# Patient Record
Sex: FEMALE | Race: WHITE | Employment: OTHER | URBAN - METROPOLITAN AREA
[De-identification: names, ages, dates, MRNs, and addresses within clinical notes are randomized per-mention and may not be internally consistent; named-entity substitution may affect disease eponyms.]

---

## 2021-03-11 ENCOUNTER — HOSPITAL ENCOUNTER (OUTPATIENT)
Dept: PREADMISSION TESTING | Age: 66
Discharge: HOME | End: 2021-03-11
Payer: MEDICARE

## 2021-03-11 PROCEDURE — U0003 INFECTIOUS AGENT DETECTION BY NUCLEIC ACID (DNA OR RNA); SEVERE ACUTE RESPIRATORY SYNDROME CORONAVIRUS 2 (SARS-COV-2) (CORONAVIRUS DISEASE [COVID-19]), AMPLIFIED PROBE TECHNIQUE, MAKING USE OF HIGH THROUGHPUT TECHNOLOGIES AS DESCRIBED BY CMS-2020-01-R: HCPCS

## 2021-03-12 LAB — SARS-COV-2, COV2NT: NOT DETECTED

## 2021-03-16 ENCOUNTER — ANESTHESIA EVENT (OUTPATIENT)
Dept: SURGERY | Age: 66
End: 2021-03-16
Payer: MEDICARE

## 2021-03-16 RX ORDER — FENTANYL CITRATE 50 UG/ML
50 INJECTION, SOLUTION INTRAMUSCULAR; INTRAVENOUS
Status: CANCELLED | OUTPATIENT
Start: 2021-03-16

## 2021-03-16 RX ORDER — ONDANSETRON 2 MG/ML
4 INJECTION INTRAMUSCULAR; INTRAVENOUS ONCE
Status: CANCELLED | OUTPATIENT
Start: 2021-03-16 | End: 2021-03-16

## 2021-03-16 RX ORDER — OXYCODONE AND ACETAMINOPHEN 5; 325 MG/1; MG/1
1 TABLET ORAL AS NEEDED
Status: CANCELLED | OUTPATIENT
Start: 2021-03-16

## 2021-03-16 RX ORDER — DEXTROSE MONOHYDRATE 100 MG/ML
125-250 INJECTION, SOLUTION INTRAVENOUS AS NEEDED
Status: CANCELLED | OUTPATIENT
Start: 2021-03-16

## 2021-03-16 RX ORDER — MAGNESIUM SULFATE 100 %
4 CRYSTALS MISCELLANEOUS AS NEEDED
Status: CANCELLED | OUTPATIENT
Start: 2021-03-16

## 2021-03-16 RX ORDER — FENTANYL CITRATE 50 UG/ML
25 INJECTION, SOLUTION INTRAMUSCULAR; INTRAVENOUS AS NEEDED
Status: CANCELLED | OUTPATIENT
Start: 2021-03-16

## 2021-03-16 RX ORDER — SODIUM CHLORIDE 9 MG/ML
125 INJECTION, SOLUTION INTRAVENOUS CONTINUOUS
Status: CANCELLED | OUTPATIENT
Start: 2021-03-16

## 2021-03-16 RX ORDER — NALOXONE HYDROCHLORIDE 0.4 MG/ML
0.1 INJECTION, SOLUTION INTRAMUSCULAR; INTRAVENOUS; SUBCUTANEOUS AS NEEDED
Status: CANCELLED | OUTPATIENT
Start: 2021-03-16

## 2021-03-17 ENCOUNTER — HOSPITAL ENCOUNTER (OUTPATIENT)
Age: 66
Setting detail: OUTPATIENT SURGERY
Discharge: HOME | End: 2021-03-17
Attending: OPHTHALMOLOGY | Admitting: OPHTHALMOLOGY
Payer: MEDICARE

## 2021-03-17 ENCOUNTER — ANESTHESIA (OUTPATIENT)
Dept: SURGERY | Age: 66
End: 2021-03-17
Payer: MEDICARE

## 2021-03-17 VITALS
BODY MASS INDEX: 26.71 KG/M2 | HEART RATE: 62 BPM | OXYGEN SATURATION: 97 % | TEMPERATURE: 97.2 F | HEIGHT: 61 IN | DIASTOLIC BLOOD PRESSURE: 71 MMHG | WEIGHT: 141.5 LBS | SYSTOLIC BLOOD PRESSURE: 123 MMHG | RESPIRATION RATE: 16 BRPM

## 2021-03-17 PROBLEM — H26.9 CATARACT: Status: ACTIVE | Noted: 2021-03-17

## 2021-03-17 PROCEDURE — 77030040361 HC SLV COMPR DVT MDII -B: Performed by: OPHTHALMOLOGY

## 2021-03-17 PROCEDURE — 76210000020 HC REC RM PH II FIRST 0.5 HR: Performed by: OPHTHALMOLOGY

## 2021-03-17 PROCEDURE — 74011250636 HC RX REV CODE- 250/636: Performed by: NURSE ANESTHETIST, CERTIFIED REGISTERED

## 2021-03-17 PROCEDURE — 77030006685 HC BLD OPHTH ALCN -B: Performed by: OPHTHALMOLOGY

## 2021-03-17 PROCEDURE — 76010000154 HC OR TIME FIRST 0.5 HR: Performed by: OPHTHALMOLOGY

## 2021-03-17 PROCEDURE — 76060000031 HC ANESTHESIA FIRST 0.5 HR: Performed by: OPHTHALMOLOGY

## 2021-03-17 PROCEDURE — 77030013851 HC PK PHACO KT ALCN -B: Performed by: OPHTHALMOLOGY

## 2021-03-17 PROCEDURE — 77030020782 HC GWN BAIR PAWS FLX 3M -B: Performed by: OPHTHALMOLOGY

## 2021-03-17 PROCEDURE — 74011000250 HC RX REV CODE- 250: Performed by: OPHTHALMOLOGY

## 2021-03-17 PROCEDURE — 77030031761 HC CYSTOTOM OPHTH IRR SYS BVTC -A: Performed by: OPHTHALMOLOGY

## 2021-03-17 PROCEDURE — 2709999900 HC NON-CHARGEABLE SUPPLY: Performed by: OPHTHALMOLOGY

## 2021-03-17 PROCEDURE — 77030013389: Performed by: OPHTHALMOLOGY

## 2021-03-17 PROCEDURE — 74011250636 HC RX REV CODE- 250/636: Performed by: OPHTHALMOLOGY

## 2021-03-17 PROCEDURE — V2632 POST CHMBR INTRAOCULAR LENS: HCPCS | Performed by: OPHTHALMOLOGY

## 2021-03-17 PROCEDURE — 77030018837 HC SOL IRR OPTH ALCN -A: Performed by: OPHTHALMOLOGY

## 2021-03-17 DEVICE — ACRYSOF(R) SINGLE-PIECE ACRYLIC FOLDABLE IOL,UV-ABSORBING POSTERIOR CHAMBER LENS (IOL/PC),13.0MM LENGTH, 6.0MM BICONVEX OPTIC, PLANARHAPTICS.
Type: IMPLANTABLE DEVICE | Site: EYE | Status: FUNCTIONAL
Brand: ACRYSOF®

## 2021-03-17 RX ORDER — SODIUM CHLORIDE, SODIUM LACTATE, POTASSIUM CHLORIDE, CALCIUM CHLORIDE 600; 310; 30; 20 MG/100ML; MG/100ML; MG/100ML; MG/100ML
75 INJECTION, SOLUTION INTRAVENOUS CONTINUOUS
Status: DISCONTINUED | OUTPATIENT
Start: 2021-03-17 | End: 2021-03-17 | Stop reason: HOSPADM

## 2021-03-17 RX ORDER — EPINEPHRINE 1 MG/ML
INJECTION, SOLUTION, CONCENTRATE INTRAVENOUS AS NEEDED
Status: DISCONTINUED | OUTPATIENT
Start: 2021-03-17 | End: 2021-03-17 | Stop reason: HOSPADM

## 2021-03-17 RX ORDER — MIDAZOLAM HYDROCHLORIDE 1 MG/ML
INJECTION, SOLUTION INTRAMUSCULAR; INTRAVENOUS AS NEEDED
Status: DISCONTINUED | OUTPATIENT
Start: 2021-03-17 | End: 2021-03-17 | Stop reason: HOSPADM

## 2021-03-17 RX ORDER — PHENYLEPHRINE HYDROCHLORIDE 25 MG/ML
1 SOLUTION/ DROPS OPHTHALMIC
Status: COMPLETED | OUTPATIENT
Start: 2021-03-17 | End: 2021-03-17

## 2021-03-17 RX ORDER — LIDOCAINE HYDROCHLORIDE 10 MG/ML
INJECTION, SOLUTION EPIDURAL; INFILTRATION; INTRACAUDAL; PERINEURAL AS NEEDED
Status: DISCONTINUED | OUTPATIENT
Start: 2021-03-17 | End: 2021-03-17 | Stop reason: HOSPADM

## 2021-03-17 RX ORDER — FENTANYL CITRATE 50 UG/ML
INJECTION, SOLUTION INTRAMUSCULAR; INTRAVENOUS AS NEEDED
Status: DISCONTINUED | OUTPATIENT
Start: 2021-03-17 | End: 2021-03-17 | Stop reason: HOSPADM

## 2021-03-17 RX ORDER — TROPICAMIDE 10 MG/ML
1 SOLUTION/ DROPS OPHTHALMIC
Status: COMPLETED | OUTPATIENT
Start: 2021-03-17 | End: 2021-03-17

## 2021-03-17 RX ADMIN — LIDOCAINE HYDROCHLORIDE 2 DROP: 35 GEL OPHTHALMIC at 07:40

## 2021-03-17 RX ADMIN — PHENYLEPHRINE HYDROCHLORIDE 1 DROP: 2.5 SOLUTION/ DROPS OPHTHALMIC at 07:35

## 2021-03-17 RX ADMIN — SODIUM CHLORIDE, SODIUM LACTATE, POTASSIUM CHLORIDE, AND CALCIUM CHLORIDE 75 ML/HR: 600; 310; 30; 20 INJECTION, SOLUTION INTRAVENOUS at 07:30

## 2021-03-17 RX ADMIN — MIDAZOLAM 1 MG: 1 INJECTION INTRAMUSCULAR; INTRAVENOUS at 08:52

## 2021-03-17 RX ADMIN — FENTANYL CITRATE 25 MCG: 50 INJECTION, SOLUTION INTRAMUSCULAR; INTRAVENOUS at 08:48

## 2021-03-17 RX ADMIN — MIDAZOLAM 1 MG: 1 INJECTION INTRAMUSCULAR; INTRAVENOUS at 08:46

## 2021-03-17 RX ADMIN — FENTANYL CITRATE 25 MCG: 50 INJECTION, SOLUTION INTRAMUSCULAR; INTRAVENOUS at 08:55

## 2021-03-17 RX ADMIN — TROPICAMIDE 1 DROP: 10 SOLUTION/ DROPS OPHTHALMIC at 07:25

## 2021-03-17 RX ADMIN — FENTANYL CITRATE 25 MCG: 50 INJECTION, SOLUTION INTRAMUSCULAR; INTRAVENOUS at 09:00

## 2021-03-17 RX ADMIN — FENTANYL CITRATE 25 MCG: 50 INJECTION, SOLUTION INTRAMUSCULAR; INTRAVENOUS at 08:50

## 2021-03-17 RX ADMIN — PHENYLEPHRINE HYDROCHLORIDE 1 DROP: 2.5 SOLUTION/ DROPS OPHTHALMIC at 07:25

## 2021-03-17 RX ADMIN — PHENYLEPHRINE HYDROCHLORIDE 1 DROP: 2.5 SOLUTION/ DROPS OPHTHALMIC at 07:40

## 2021-03-17 RX ADMIN — TROPICAMIDE 1 DROP: 10 SOLUTION/ DROPS OPHTHALMIC at 07:35

## 2021-03-17 RX ADMIN — TROPICAMIDE 1 DROP: 10 SOLUTION/ DROPS OPHTHALMIC at 07:40

## 2021-03-17 RX ADMIN — PHENYLEPHRINE HYDROCHLORIDE 1 DROP: 2.5 SOLUTION/ DROPS OPHTHALMIC at 07:30

## 2021-03-17 RX ADMIN — TROPICAMIDE 1 DROP: 10 SOLUTION/ DROPS OPHTHALMIC at 07:30

## 2021-03-17 NOTE — PERIOP NOTES
Reviewed PTA medication list with patient/caregiver and patient/caregiver denies any additional medications. Patient admits to having a responsible adult care for them at home for at least 24 hours after surgery. Patient encouraged to use gown warming system and informed that using said warming gown to regulate body temperature prior to a procedure has been shown to help reduce the risks of blood clots and infection. Patient's pharmacy of choice verified and documented in PTA medication section. Dual skin assessment & fall risk band verification completed with Elaine Rome RN.

## 2021-03-17 NOTE — ANESTHESIA POSTPROCEDURE EVALUATION
Post-Anesthesia Evaluation and Assessment    Cardiovascular Function/Vital Signs  Visit Vitals  /64   Pulse 71   Temp 36.1 °C (97 °F)   Resp 15   Ht 5' 1\" (1.549 m)   Wt 64.2 kg (141 lb 8 oz)   SpO2 99%   BMI 26.74 kg/m²       Patient is status post Procedure(s):  CATARACT EXTRACTION WITH INTRA OCULAR LENS IMPLANT RIGHT EYE. Nausea/Vomiting: Controlled. Postoperative hydration reviewed and adequate. Pain:  Pain Scale 1: Numeric (0 - 10) (03/17/21 0717)  Pain Intensity 1: 0 (03/17/21 0717)   Managed. Neurological Status:   Neuro (WDL): Within Defined Limits (03/17/21 0729)   At baseline. Mental Status and Level of Consciousness: Baseline and appropriate for discharge. Pulmonary Status:   O2 Device: Room air (03/17/21 0911)   Adequate oxygenation and airway patent. Complications related to anesthesia: None    Post-anesthesia assessment completed. No concerns. Patient has met all discharge requirements.     Signed By: Delia Millard CRNA    March 17, 2021

## 2021-03-17 NOTE — PERIOP NOTES
Discharge instructions completed via phone call to Cam Hatchet- opportunity for questions - AVS med list reviewed for duplicates

## 2021-03-17 NOTE — DISCHARGE INSTRUCTIONS
Post-Operative Cataract Instructions  [facility] INVASIVE SURGERY [facility] Physicians  ALDO Mancilla. Ossie Rinne, M.D. [address] [facility]  [phone]      Diet  1. Resume normal diet. 2. Do not drink alcoholic beverages, including beer for 24 hours. Activity  1. Do not drive a car or operate any hazardous machinery the day of surgery. 2. You may resume normal activities as tolerated. 3. No bending or heavy lifting. 4. No reading or computer work after your surgery. You may watch TV. Wound Care  1. Anticipate that your eye will tear and water. 2. You may also experience a sensation of a foreign body, sand, or grit in the surgical eye, this is normal.  3. Do not touch the affected eye. 4. ** Do not remove eye shield unless directed to do so by your physician. **  5. Wear eye shield when resting or sleeping for one week. Medications  1. Take Tylenol Extra Strength two (2) tablets by mouth upon arrival home and then every four (4) hours as needed for discomfort. 2. Regarding Eye drops:  -  Begin using your eye drops as directed by your physician in the (right) operative eye. One drop of     []   Zymar    [x]  Vigamox   along with one (1) drop     []  Acular    [x]  Voltaren   every four (4) hours while awake. Wait five (5) minutes then apply one (1) drop     []  Pred Forte    [x]  Econopred Plus   every four (4) hours while awake. 3. If you take glaucoma medications, continue to do so unless the physician otherwise instructs you. 4. You may use artificial tears as needed if your eye feels scratchy. Notify your Physician Immediately for any of the followin. Excessive pain not relieved by Tylenol especially headache or increasing pressure to the operative eye. 2. Persistent nausea lasting more than eight hours. 3. If any vomiting occurs. If any questions or concerns arise, call your Surgeon at [phone].     Follow drop schedule from Dr. Tiff Escamilla office    Aleena Azucena from Nurse    PATIENT INSTRUCTIONS:    After general anesthesia or intravenous sedation, for 24 hours or while taking prescription Narcotics:  · Limit your activities  · Do not drive and operate hazardous machinery  · Do not make important personal or business decisions  · Do  not drink alcoholic beverages  · If you have not urinated within 8 hours after discharge, please contact your surgeon on call. Report the following to your surgeon:  · Excessive pain, swelling, redness or odor of or around the surgical area  · Temperature over 100.5  · Nausea and vomiting lasting longer than 4 hours or if unable to take medications  · Any signs of decreased circulation or nerve impairment to extremity: change in color, persistent  numbness, tingling, coldness or increase pain  · Any questions    What to do at Home:  Recommended activity: Ambulate in house,    If you experience any of the following symptoms above, please follow up with Dr. Nery Jeter. *  Please give a list of your current medications to your Primary Care Provider. *  Please update this list whenever your medications are discontinued, doses are      changed, or new medications (including over-the-counter products) are added. *  Please carry medication information at all times in case of emergency situations. These are general instructions for a healthy lifestyle:    No smoking/ No tobacco products/ Avoid exposure to second hand smoke  Surgeon General's Warning:  Quitting smoking now greatly reduces serious risk to your health.     Obesity, smoking, and sedentary lifestyle greatly increases your risk for illness    A healthy diet, regular physical exercise & weight monitoring are important for maintaining a healthy lifestyle    You may be retaining fluid if you have a history of heart failure or if you experience any of the following symptoms:  Weight gain of 3 pounds or more overnight or 5 pounds in a week, increased swelling in our hands or feet or shortness of breath while lying flat in bed.  Please call your doctor as soon as you notice any of these symptoms; do not wait until your next office visit.    Patient armband removed and shredded    The discharge information has been reviewed with the patient and caregiver.  The patient and caregiver verbalized understanding.  Discharge medications reviewed with the patient and caregiver and appropriate educational materials and side effects teaching were provided.    Learning About Coronavirus (COVID-19)  Coronavirus (COVID-19): Overview  What is coronavirus (COVID-19)?  The coronavirus disease (COVID-19) is caused by a virus. It is an illness that was first found in [facility], in December 2019. It has since spread worldwide.  The virus can cause fever, cough, and trouble breathing. In severe cases, it can cause pneumonia and make it hard to breathe without help. It can cause death.  Coronaviruses are a large group of viruses. They cause the common cold. They also cause more serious illnesses like Middle East respiratory syndrome (MERS) and severe acute respiratory syndrome (SARS). COVID-19 is caused by a novel coronavirus. That means it's a new type that has not been seen in people before.  This virus spreads person-to-person through droplets from coughing and sneezing. It can also spread when you are close to someone who is infected. And it can spread when you touch something that has the virus on it, such as a doorknob or a tabletop.  What can you do to protect yourself from coronavirus (COVID-19)?  The best way to protect yourself from getting sick is to:  · Avoid areas where there is an outbreak.  · Avoid contact with people who may be infected.  · Wash your hands often with soap or alcohol-based hand sanitizers.  · Avoid crowds and try to stay at least 6 feet away from other people.  · Wash your hands often, especially after you cough or sneeze.  Use soap and water, and scrub for at least 20 seconds. If soap and water aren't available, use an alcohol-based hand . · Avoid touching your mouth, nose, and eyes. What can you do to avoid spreading the virus to others? To help avoid spreading the virus to others:  · Cover your mouth with a tissue when you cough or sneeze. Then throw the tissue in the trash. · Use a disinfectant to clean things that you touch often. · Stay home if you are sick or have been exposed to the virus. Don't go to school, work, or public areas. And don't use public transportation. · If you are sick:  ? Leave your home only if you need to get medical care. But call the doctor's office first so they know you're coming. And wear a face mask, if you have one.  ? If you have a face mask, wear it whenever you're around other people. It can help stop the spread of the virus when you cough or sneeze. ? Clean and disinfect your home every day. Use household  and disinfectant wipes or sprays. Take special care to clean things that you grab with your hands. These include doorknobs, remote controls, phones, and handles on your refrigerator and microwave. And don't forget countertops, tabletops, bathrooms, and computer keyboards. When to call for help  Call 911 anytime you think you may need emergency care. For example, call if:  · You have severe trouble breathing. (You can't talk at all.)  · You have constant chest pain or pressure. · You are severely dizzy or lightheaded. · You are confused or can't think clearly. · Your face and lips have a blue color. · You pass out (lose consciousness) or are very hard to wake up. Call your doctor now if you develop symptoms such as:  · Shortness of breath. · Fever. · Cough. If you need to get care, call ahead to the doctor's office for instructions before you go. Make sure you wear a face mask, if you have one, to prevent exposing other people to the virus.   Where can you get the latest information? The following health organizations are tracking and studying this virus. Their websites contain the most up-to-date information. Jigar Dina also learn what to do if you think you may have been exposed to the virus. · U.S. Centers for Disease Control and Prevention (CDC): The CDC provides updated news about the disease and travel advice. The website also tells you how to prevent the spread of infection. www.cdc.gov  · World Health Organization [facility]): WHO offers information about the virus outbreaks. WHO also has travel advice. www.who.int  Current as of: April 1, 2020               Content Version: 12.4  © 5691-2768 Healthwise, Incorporated. Care instructions adapted under license by your healthcare professional. If you have questions about a medical condition or this instruction, always ask your healthcare professional. [address] any warranty or liability for your use of this information.     ___________________________________________________________________________________________________________________________________

## 2021-03-17 NOTE — ANESTHESIA PREPROCEDURE EVALUATION
Relevant Problems   No relevant active problems       Anesthetic History   No history of anesthetic complications     Pertinent negatives: No PONV       Review of Systems / Medical History  Patient summary reviewed, nursing notes reviewed and pertinent labs reviewed    Pulmonary              Pertinent negatives: No COPD, asthma and smoker     Neuro/Psych   Within defined limits           Cardiovascular    Hypertension            Pertinent negatives: No past MI and CAD       GI/Hepatic/Renal  Within defined limits           Pertinent negatives: No GERD, liver disease and renal disease   Endo/Other      Hypothyroidism    Pertinent negatives: No diabetes   Other Findings   Comments: etoh 2/ week           Physical Exam    Airway  Mallampati: III  TM Distance: 4 - 6 cm  Neck ROM: decreased range of motion   Mouth opening: Normal     Cardiovascular               Dental    Dentition: Caps/crowns     Pulmonary                 Abdominal         Other Findings            Anesthetic Plan    ASA: 2  Anesthesia type: MAC          Induction: Intravenous  Anesthetic plan and risks discussed with: Patient

## 2021-03-17 NOTE — OP NOTES
Cataract Operative Note      Patient: Maddie Johnson               Sex: female          DOA: 3/17/2021         YOB: 1955      Age:  76 y.o.        LOS:  LOS: 0 days     Preoperative Diagnosis: Cataract right eye    Postoperative Diagnosis:  Cataract  right eye  Surgeon: Sidney Gamez MD, M.D. Anesthesia:  Topical anesthesia  Procedure:  Phacoemulsification of posterior chamber for intraocular lens implantation right    Fluids:  0    Procedure in Detail: The operative eye was prepped and draped in the usual fashion. A lid speculum was placed in the operative eye. A clear cornea approach was utilized. A paracentesis incision(s) was constructed with a 1 mm slit knife. One percent preservative-free lidocaine followed by viscoelastic was instilled into the anterior chamber. A clear corneal incision was made with a slit knife. A continuous curvilinear capsulorrhexis was constructed followed by hydrodissection. A phacoemulsification tip was placed into the eye, and the lens nucleus was emulsified. The irrigation/aspiration device was then used to remove any remaining cortical material.  Polishing of the capsule was performed as needed. The intraocular lens was then placed into the capsular bag after it was re-inflated with viscoelastic. The remaining viscoelastic was then removed using the irrigation/aspiration device. BSS on a cannula was then used to hydrate the wound edges. At the end of the procedure the wound was found to be watertight, the anterior chamber was deep and the pupil round. No blood loss during surgery. An antibiotic and anti-inflammatroy was placed into the operative eye. The lid speculum was removed. Protective sunglasses were then placed onto the patient. The patient was taken to the [address] [place] [address] Unit (PACU) in good condition having tolerated the procedure well. Estimated Blood Loss: 0                 Implants:   Implant Name Type Inv.  Item Serial No.  Lot No. LRB No. Used Action   LENS INTOCU 6.0 TO 30.0 DIOPT 118.4 A CONSTANT 0DEG ANG - [id number] Intraocular Lens LENS INTOCU 6.0 TO 30.0 DIOPT 118.4 A CONSTANT 0DEG ANG [id number] [employer]  Right 1 Implanted       Specimens: * No specimens in log *            Complications:  None           Mike Arango MD, M.D.  [unfilled]  9:10 AM

## 2021-03-17 NOTE — INTERVAL H&P NOTE
Update History & Physical    The Patient's History and Physical of March 17, 2021 was reviewed with the patient and I examined the patient. There was no change. The surgical site was confirmed by the patient and me. Plan:  The risk, benefits, expected outcome, and alternative to the recommended procedure have been discussed with the patient. Patient understands and wants to proceed with the procedure.     Electronically signed by Ward Das MD on 3/17/2021 at 7:24 AM

## 2021-03-18 NOTE — ADDENDUM NOTE
Addendum  created 03/17/21 2036 by Rene Cristina MD    Attestation recorded in [address] filed

## 2021-06-16 ENCOUNTER — ANESTHESIA (OUTPATIENT)
Dept: SURGERY | Age: 66
End: 2021-06-16
Payer: MEDICARE

## 2021-06-16 ENCOUNTER — HOSPITAL ENCOUNTER (OUTPATIENT)
Age: 66
Setting detail: OUTPATIENT SURGERY
Discharge: HOME | End: 2021-06-16
Attending: OPHTHALMOLOGY | Admitting: OPHTHALMOLOGY
Payer: MEDICARE

## 2021-06-16 ENCOUNTER — ANESTHESIA EVENT (OUTPATIENT)
Dept: SURGERY | Age: 66
End: 2021-06-16
Payer: MEDICARE

## 2021-06-16 VITALS
OXYGEN SATURATION: 100 % | WEIGHT: 139.9 LBS | HEART RATE: 63 BPM | SYSTOLIC BLOOD PRESSURE: 118 MMHG | TEMPERATURE: 97.2 F | BODY MASS INDEX: 26.41 KG/M2 | DIASTOLIC BLOOD PRESSURE: 69 MMHG | RESPIRATION RATE: 16 BRPM | HEIGHT: 61 IN

## 2021-06-16 PROCEDURE — 74011250636 HC RX REV CODE- 250/636: Performed by: OPHTHALMOLOGY

## 2021-06-16 PROCEDURE — 74011250636 HC RX REV CODE- 250/636: Performed by: NURSE ANESTHETIST, CERTIFIED REGISTERED

## 2021-06-16 PROCEDURE — 76060000031 HC ANESTHESIA FIRST 0.5 HR: Performed by: OPHTHALMOLOGY

## 2021-06-16 PROCEDURE — 77030031761 HC CYSTOTOM OPHTH IRR SYS BVTC -A: Performed by: OPHTHALMOLOGY

## 2021-06-16 PROCEDURE — 77030020782 HC GWN BAIR PAWS FLX 3M -B: Performed by: OPHTHALMOLOGY

## 2021-06-16 PROCEDURE — 77030040361 HC SLV COMPR DVT MDII -B: Performed by: OPHTHALMOLOGY

## 2021-06-16 PROCEDURE — 76010000154 HC OR TIME FIRST 0.5 HR: Performed by: OPHTHALMOLOGY

## 2021-06-16 PROCEDURE — 77030006685 HC BLD OPHTH ALCN -B: Performed by: OPHTHALMOLOGY

## 2021-06-16 PROCEDURE — 74011000250 HC RX REV CODE- 250: Performed by: OPHTHALMOLOGY

## 2021-06-16 PROCEDURE — 77030013851 HC PK PHACO KT ALCN -B: Performed by: OPHTHALMOLOGY

## 2021-06-16 PROCEDURE — 2709999900 HC NON-CHARGEABLE SUPPLY: Performed by: OPHTHALMOLOGY

## 2021-06-16 PROCEDURE — 77030018837 HC SOL IRR OPTH ALCN -A: Performed by: OPHTHALMOLOGY

## 2021-06-16 PROCEDURE — 77030013389: Performed by: OPHTHALMOLOGY

## 2021-06-16 PROCEDURE — 76210000021 HC REC RM PH II 0.5 TO 1 HR: Performed by: OPHTHALMOLOGY

## 2021-06-16 PROCEDURE — V2632 POST CHMBR INTRAOCULAR LENS: HCPCS | Performed by: OPHTHALMOLOGY

## 2021-06-16 DEVICE — ACRYSOF(R) SINGLE-PIECE ACRYLIC FOLDABLE IOL,UV-ABSORBING POSTERIOR CHAMBER LENS (IOL/PC),13.0MM LENGTH, 6.0MM BICONVEX OPTIC, PLANARHAPTICS.
Type: IMPLANTABLE DEVICE | Site: EYE | Status: FUNCTIONAL
Brand: ACRYSOF®

## 2021-06-16 RX ORDER — TROPICAMIDE 10 MG/ML
1 SOLUTION/ DROPS OPHTHALMIC
Status: COMPLETED | OUTPATIENT
Start: 2021-06-16 | End: 2021-06-16

## 2021-06-16 RX ORDER — ONDANSETRON 2 MG/ML
INJECTION INTRAMUSCULAR; INTRAVENOUS AS NEEDED
Status: DISCONTINUED | OUTPATIENT
Start: 2021-06-16 | End: 2021-06-16 | Stop reason: HOSPADM

## 2021-06-16 RX ORDER — SODIUM CHLORIDE 0.9 % (FLUSH) 0.9 %
5-40 SYRINGE (ML) INJECTION AS NEEDED
Status: CANCELLED | OUTPATIENT
Start: 2021-06-16

## 2021-06-16 RX ORDER — DEXTROSE 50 % IN WATER (D50W) INTRAVENOUS SYRINGE
25-50 AS NEEDED
Status: CANCELLED | OUTPATIENT
Start: 2021-06-16

## 2021-06-16 RX ORDER — LIDOCAINE HYDROCHLORIDE 10 MG/ML
INJECTION, SOLUTION EPIDURAL; INFILTRATION; INTRACAUDAL; PERINEURAL AS NEEDED
Status: DISCONTINUED | OUTPATIENT
Start: 2021-06-16 | End: 2021-06-16 | Stop reason: HOSPADM

## 2021-06-16 RX ORDER — MIDAZOLAM HYDROCHLORIDE 1 MG/ML
INJECTION, SOLUTION INTRAMUSCULAR; INTRAVENOUS AS NEEDED
Status: DISCONTINUED | OUTPATIENT
Start: 2021-06-16 | End: 2021-06-16 | Stop reason: HOSPADM

## 2021-06-16 RX ORDER — EPINEPHRINE 1 MG/ML
INJECTION, SOLUTION, CONCENTRATE INTRAVENOUS AS NEEDED
Status: DISCONTINUED | OUTPATIENT
Start: 2021-06-16 | End: 2021-06-16 | Stop reason: HOSPADM

## 2021-06-16 RX ORDER — MAGNESIUM SULFATE 100 %
4 CRYSTALS MISCELLANEOUS AS NEEDED
Status: CANCELLED | OUTPATIENT
Start: 2021-06-16

## 2021-06-16 RX ORDER — FENTANYL CITRATE 50 UG/ML
25 INJECTION, SOLUTION INTRAMUSCULAR; INTRAVENOUS AS NEEDED
Status: CANCELLED | OUTPATIENT
Start: 2021-06-16

## 2021-06-16 RX ORDER — SODIUM CHLORIDE, SODIUM LACTATE, POTASSIUM CHLORIDE, CALCIUM CHLORIDE 600; 310; 30; 20 MG/100ML; MG/100ML; MG/100ML; MG/100ML
125 INJECTION, SOLUTION INTRAVENOUS CONTINUOUS
Status: DISCONTINUED | OUTPATIENT
Start: 2021-06-16 | End: 2021-06-16 | Stop reason: HOSPADM

## 2021-06-16 RX ORDER — INSULIN LISPRO 100 [IU]/ML
INJECTION, SOLUTION INTRAVENOUS; SUBCUTANEOUS ONCE
Status: CANCELLED | OUTPATIENT
Start: 2021-06-16 | End: 2021-06-17

## 2021-06-16 RX ORDER — SODIUM CHLORIDE 0.9 % (FLUSH) 0.9 %
5-40 SYRINGE (ML) INJECTION EVERY 8 HOURS
Status: CANCELLED | OUTPATIENT
Start: 2021-06-16

## 2021-06-16 RX ORDER — HYDROMORPHONE HYDROCHLORIDE 1 MG/ML
0.5 INJECTION, SOLUTION INTRAMUSCULAR; INTRAVENOUS; SUBCUTANEOUS
Status: CANCELLED | OUTPATIENT
Start: 2021-06-16

## 2021-06-16 RX ORDER — ONDANSETRON 2 MG/ML
4 INJECTION INTRAMUSCULAR; INTRAVENOUS ONCE
Status: CANCELLED | OUTPATIENT
Start: 2021-06-16 | End: 2021-06-16

## 2021-06-16 RX ORDER — SODIUM CHLORIDE, SODIUM LACTATE, POTASSIUM CHLORIDE, CALCIUM CHLORIDE 600; 310; 30; 20 MG/100ML; MG/100ML; MG/100ML; MG/100ML
100 INJECTION, SOLUTION INTRAVENOUS CONTINUOUS
Status: CANCELLED | OUTPATIENT
Start: 2021-06-16

## 2021-06-16 RX ORDER — NALOXONE HYDROCHLORIDE 0.4 MG/ML
0.1 INJECTION, SOLUTION INTRAMUSCULAR; INTRAVENOUS; SUBCUTANEOUS AS NEEDED
Status: CANCELLED | OUTPATIENT
Start: 2021-06-16

## 2021-06-16 RX ORDER — PHENYLEPHRINE HYDROCHLORIDE 25 MG/ML
1 SOLUTION/ DROPS OPHTHALMIC
Status: COMPLETED | OUTPATIENT
Start: 2021-06-16 | End: 2021-06-16

## 2021-06-16 RX ORDER — FENTANYL CITRATE 50 UG/ML
INJECTION, SOLUTION INTRAMUSCULAR; INTRAVENOUS AS NEEDED
Status: DISCONTINUED | OUTPATIENT
Start: 2021-06-16 | End: 2021-06-16 | Stop reason: HOSPADM

## 2021-06-16 RX ADMIN — LIDOCAINE HYDROCHLORIDE 2 DROP: 35 GEL OPHTHALMIC at 11:53

## 2021-06-16 RX ADMIN — TROPICAMIDE 1 DROP: 10 SOLUTION/ DROPS OPHTHALMIC at 11:48

## 2021-06-16 RX ADMIN — PHENYLEPHRINE HYDROCHLORIDE 1 DROP: 2.5 SOLUTION/ DROPS OPHTHALMIC at 11:43

## 2021-06-16 RX ADMIN — ONDANSETRON HYDROCHLORIDE 4 MG: 2 INJECTION INTRAMUSCULAR; INTRAVENOUS at 12:36

## 2021-06-16 RX ADMIN — SODIUM CHLORIDE, SODIUM LACTATE, POTASSIUM CHLORIDE, AND CALCIUM CHLORIDE 125 ML/HR: 600; 310; 30; 20 INJECTION, SOLUTION INTRAVENOUS at 11:54

## 2021-06-16 RX ADMIN — PHENYLEPHRINE HYDROCHLORIDE 1 DROP: 2.5 SOLUTION/ DROPS OPHTHALMIC at 11:33

## 2021-06-16 RX ADMIN — FENTANYL CITRATE 50 MCG: 50 INJECTION, SOLUTION INTRAMUSCULAR; INTRAVENOUS at 12:34

## 2021-06-16 RX ADMIN — MIDAZOLAM 2 MG: 1 INJECTION INTRAMUSCULAR; INTRAVENOUS at 12:31

## 2021-06-16 RX ADMIN — TROPICAMIDE 1 DROP: 10 SOLUTION/ DROPS OPHTHALMIC at 11:38

## 2021-06-16 RX ADMIN — PHENYLEPHRINE HYDROCHLORIDE 1 DROP: 2.5 SOLUTION/ DROPS OPHTHALMIC at 11:38

## 2021-06-16 RX ADMIN — FENTANYL CITRATE 50 MCG: 50 INJECTION, SOLUTION INTRAMUSCULAR; INTRAVENOUS at 12:43

## 2021-06-16 RX ADMIN — TROPICAMIDE 1 DROP: 10 SOLUTION/ DROPS OPHTHALMIC at 11:33

## 2021-06-16 RX ADMIN — PHENYLEPHRINE HYDROCHLORIDE 1 DROP: 2.5 SOLUTION/ DROPS OPHTHALMIC at 11:48

## 2021-06-16 RX ADMIN — TROPICAMIDE 1 DROP: 10 SOLUTION/ DROPS OPHTHALMIC at 11:43

## 2021-06-16 NOTE — DISCHARGE INSTRUCTIONS
Post-Operative Cataract Instructions  [facility] INV[facility]IVE SURGERY hospitals Physicians  Lev Dawson M.D. Treva Joseph. Mayank Sanders M.D.  [address] [facility] Cent[facility]ommy [facility]  [phone]      Diet  1. Resume normal diet. 2. Do not drink alcoholic beverages, including beer for 24 hours. Activity  1. Do not drive a car or operate any hazardous machinery the day of surgery. 2. You may resume normal activities as tolerated. 3. No bending or heavy lifting. 4. No reading or computer work after your surgery. You may watch TV. Wound Care  1. Anticipate that your eye will tear and water. 2. You may also experience a sensation of a foreign body, sand, or grit in the surgical eye, this is normal.  3. Do not touch the affected eye. 4. ** Do not remove eye shield unless directed to do so by your physician. **  5. Wear eye shield when resting or sleeping for one week. Medications  1. Take Tylenol Extra Strength two (2) tablets by mouth upon arrival home and then every four (4) hours as needed for discomfort. 2. Regarding Eye drops:  -  Begin using your eye drops as directed by your physician in the (left) operative eye. One drop of     []   Zymar    [x]  Vigamox   along with one (1) drop     []  Acular    [x]  Voltaren   every four (4) hours while awake. Wait five (5) minutes then apply one (1) drop     []  Pred Forte    [x]  Econopred Plus   every four (4) hours while awake. 3. If you take glaucoma medications, continue to do so unless the physician otherwise instructs you. 4. You may use artificial tears as needed if your eye feels scratchy. Notify your Physician Immediately for any of the followin. Excessive pain not relieved by Tylenol especially headache or increasing pressure to the operative eye. 2. Persistent nausea lasting more than eight hours. 3. If any vomiting occurs. If any questions or concerns arise, call your Surgeon at [phone].       Follow drop schedule from Dr. Shannan Brito office      Dominik Szymanski from Nurse    PATIENT INSTRUCTIONS:    After general anesthesia or intravenous sedation, for 24 hours or while taking prescription Narcotics:  · Limit your activities  · Do not drive and operate hazardous machinery  · Do not make important personal or business decisions  · Do  not drink alcoholic beverages  · If you have not urinated within 8 hours after discharge, please contact your surgeon on call. Report the following to your surgeon:  · Excessive pain, swelling, redness or odor of or around the surgical area  · Temperature over 100.5  · Nausea and vomiting lasting longer than 4 hours or if unable to take medications  · Any signs of decreased circulation or nerve impairment to extremity: change in color, persistent  numbness, tingling, coldness or increase pain  · Any questions    What to do at Home:  Recommended activity: Ambulate in house,     If you experience any of the following symptoms above, please follow up with Dr. Josseline rGaf. *  Please give a list of your current medications to your Primary Care Provider. *  Please update this list whenever your medications are discontinued, doses are      changed, or new medications (including over-the-counter products) are added. *  Please carry medication information at all times in case of emergency situations. These are general instructions for a healthy lifestyle:    No smoking/ No tobacco products/ Avoid exposure to second hand smoke  Surgeon General's Warning:  Quitting smoking now greatly reduces serious risk to your health.     Obesity, smoking, and sedentary lifestyle greatly increases your risk for illness    A healthy diet, regular physical exercise & weight monitoring are important for maintaining a healthy lifestyle    You may be retaining fluid if you have a history of heart failure or if you experience any of the following symptoms:  Weight gain of 3 pounds or more overnight or 5 pounds in a week, increased swelling in our hands or feet or shortness of breath while lying flat in bed. Please call your doctor as soon as you notice any of these symptoms; do not wait until your next office visit. Patient armband removed and shredded    The discharge information has been reviewed with the patient and caregiver. The patient and caregiver verbalized understanding. Discharge medications reviewed with the patient and caregiver and appropriate educational materials and side effects teaching were provided. Learning About COVID-19 and Social Distancing  What is it? Social distancing means putting space between yourself and other people. The recommended distance is 6 feet, or about 2 meters. This also means staying away from any place where people may gather, such as brar or other public gathering places. Why is it important? Social distancing is the best way to reduce the spread of COVID-19. This virus seems to spread from person to person through droplets from coughing and sneezing. So if you keep your distance from others, you're less likely to get it or spread it. And social distancing is important for everyone, not just those who are at high risk of infection, like older people. You might have the virus but not have symptoms. You could then give the infection to someone you come into contact with. How is it done? Putting 6 feet, or about 2 meters, between you and other people is the recommended distance. Also stay away from any place where people may gather, such as brar or other public gathering places. So if possible:  · Work from home, and keep your kids at home. · Don't travel if you don't have to. And avoid public transportation, ride-shares, and taxis unless you have no choice. · Limit shopping to essentials, like food and medicines. · Wear a cloth face cover if you have to go to a public place like the grocery store or pharmacy. · Don't eat in restaurants.  (You can still get takeout or food deliveries.)  · Avoid crowds and busy places. Follow stay-at-home orders or other directions for your area. Where can you learn more? Go to http://www.gray.com/  Enter A133 in the search box to learn more about \"Learning About COVID-19 and Social Distancing. \"  Current as of: December 18, 2020               Content Version: 12.8  © 2006-2021 Healthwise, Incorporated. Care instructions adapted under license by [employer] (which disclaims liability or warranty for this information). If you have questions about a medical condition or this instruction, always ask your healthcare professional. [address] any warranty or liability for your use of this information.     ___________________________________________________________________________________________________________________________________

## 2021-06-16 NOTE — INTERVAL H&P NOTE
Update History & Physical    The Patient's History and Physical of June 16, 2021 was reviewed with the patient and I examined the patient. There was no change. The surgical site was confirmed by the patient and me. Plan:  The risk, benefits, expected outcome, and alternative to the recommended procedure have been discussed with the patient. Patient understands and wants to proceed with the procedure.     Electronically signed by So Chen MD on 6/16/2021 at 11:29 AM

## 2021-06-16 NOTE — PERIOP NOTES
Reviewed PTA medication list with patient/caregiver and patient/caregiver denies any additional medications. Patient admits to having a responsible adult care for them at home for at least 24 hours after surgery. Patient encouraged to use gown warming system and informed that using said warming gown to regulate body temperature prior to a procedure has been shown to help reduce the risks of blood clots and infection. Patient's pharmacy of choice verified and documented in PTA medication section. Dual skin assessment & fall risk band verification completed with Delia Gage. Pt denies having an active cough and confirms being able to lie still for 20 minutes.

## 2021-06-16 NOTE — OP NOTES
Cataract Operative Note      Patient: Asencion Bosworth               Sex: female          DOA: 6/16/2021         YOB: 1955      Age:  78 y.o.        LOS:  LOS: 0 days     Preoperative Diagnosis: Cataract left eye    Postoperative Diagnosis:  Cataract  left eye  Surgeon: Sekou Abbott MD, M.D. Anesthesia:  Topical anesthesia  Procedure:  Phacoemulsification of posterior chamber for intraocular lens implantation left    Fluids:  0    Procedure in Detail: The operative eye was prepped and draped in the usual fashion. A lid speculum was placed in the operative eye. A clear cornea approach was utilized. A paracentesis incision(s) was constructed with a 1 mm slit knife. One percent preservative-free lidocaine followed by viscoelastic was instilled into the anterior chamber. A clear corneal incision was made with a slit knife. A continuous curvilinear capsulorrhexis was constructed followed by hydrodissection. A phacoemulsification tip was placed into the eye, and the lens nucleus was emulsified. The irrigation/aspiration device was then used to remove any remaining cortical material.  Polishing of the capsule was performed as needed. The intraocular lens was then placed into the capsular bag after it was re-inflated with viscoelastic. The remaining viscoelastic was then removed using the irrigation/aspiration device. BSS on a cannula was then used to hydrate the wound edges. At the end of the procedure the wound was found to be watertight, the anterior chamber was deep and the pupil round. No blood loss during surgery. An antibiotic and anti-inflammatroy was placed into the operative eye. The lid speculum was removed. Protective sunglasses were then placed onto the patient. The patient was taken to the [address] [place] [address] Unit (PACU) in good condition having tolerated the procedure well. Estimated Blood Loss: 0                 Implants:   Implant Name Type Inv.  Item Serial No.  Lot No. LRB No. Used Action   LENS INTOCU 6.0 TO 30.0 DIOPT 118.4 A CONSTANT 0DEG ANG - [id number]  LENS INTOCU 6.0 TO 30.0 DIOPT 118.4 A CONSTANT 0DEG ANG [id number] [employer]  Left 1 Implanted       Specimens: * No specimens in log *            Complications:  None           Anya Reyes MD, M.D.  [unfilled]  12:52 PM

## 2021-06-16 NOTE — ANESTHESIA POSTPROCEDURE EVALUATION
Post-Anesthesia Evaluation and Assessment    Cardiovascular Function/Vital Signs  Visit Vitals  /69   Pulse 63   Temp 36.2 °C (97.2 °F)   Resp 16   Ht 5' 1\" (1.549 m)   Wt 63.5 kg (139 lb 14.4 oz)   SpO2 100%   BMI 26.43 kg/m²       Patient is status post Procedure(s):  CATARACT EXTRACTION WITH INTRA OCULAR LENS IMPLANT- LEFT EYE. Nausea/Vomiting: Controlled. Postoperative hydration reviewed and adequate. Pain:  Pain Scale 1: Numeric (0 - 10) (06/16/21 1330)  Pain Intensity 1: 0 (06/16/21 1330)   Managed. Neurological Status:   Neuro (WDL): Within Defined Limits (06/16/21 1330)   At baseline. Mental Status and Level of Consciousness: Arousable. Pulmonary Status:   O2 Device: None (Room air) (06/16/21 1330)   Adequate oxygenation and airway patent. Complications related to anesthesia: None    Patient has met all discharge requirements.     Signed By: Nick Vargas MD    June 16, 2021

## 2021-06-16 NOTE — PERIOP NOTES
Discharge instructions complete via phone call to Fabian Quinonez - opportunity for questions - AV[facility] med list reviewed for duplicates - instructions reviewed with pt - denies c/o pain - tolerating po fluids and crackers - will plan for discharge

## 2021-06-16 NOTE — ANESTHESIA PREPROCEDURE EVALUATION
Relevant Problems   No relevant active problems       Anesthetic History   No history of anesthetic complications            Review of Systems / Medical History  Patient summary reviewed, nursing notes reviewed and pertinent labs reviewed    Pulmonary  Within defined limits                 Neuro/Psych   Within defined limits           Cardiovascular    Hypertension              Exercise tolerance: >4 METS     GI/Hepatic/Renal  Within defined limits              Endo/Other      Hypothyroidism       Other Findings              Physical Exam    Airway  Mallampati: II  TM Distance: 4 - 6 cm  Neck ROM: normal range of motion   Mouth opening: Normal     Cardiovascular  Regular rate and rhythm,  S1 and S2 normal,  no murmur, click, rub, or gallop  Rhythm: regular  Rate: normal         Dental  No notable dental hx       Pulmonary  Breath sounds clear to auscultation               Abdominal  GI exam deferred       Other Findings            Anesthetic Plan    ASA: 2  Anesthesia type: MAC          Induction: Intravenous  Anesthetic plan and risks discussed with: Patient

## (undated) DEVICE — SOLUTION IRRIGATION H2O 0797305] ICU MEDICAL INC]

## (undated) DEVICE — Device

## (undated) DEVICE — 3M(TM) TEGADERM(TM) TRANSPARENT FILM DRESSING FRAME STYLE 9505W: Brand: 3M™ TEGADERM™

## (undated) DEVICE — SOLUTION IRRIGATION BAL SALT SOLUTION 500 ML STRL BSS

## (undated) DEVICE — TOWEL SURG W16XL26IN BLU NONFENESTRATED DLX ST 2 PER PK

## (undated) DEVICE — STRAP,POSITIONING,KNEE/BODY,FOAM,4X60": Brand: MEDLINE

## (undated) DEVICE — SYSTEM FLD MGMT DIA0.9MM 45DEG ULT BAL VISN ACT INTREPID

## (undated) DEVICE — GARMENT,MEDLINE,DVT,INT,CALF,MED, GEN2: Brand: MEDLINE

## (undated) DEVICE — CYTOTOME IRRIG 25GA L16MM ANT CAP BENT

## (undated) DEVICE — CLEARCUT® SLIT KNIFE INTREPID MICRO-COAXIAL SYSTEM 2.4 SB: Brand: CLEARCUT®; INTREPID

## (undated) DEVICE — 3M™ STERI-DRAPE™ INSTRUMENT POUCH 1018: Brand: STERI-DRAPE™

## (undated) DEVICE — CANN VISCOFLOW FRM 27G 22MM --